# Patient Record
Sex: MALE | NOT HISPANIC OR LATINO | URBAN - METROPOLITAN AREA
[De-identification: names, ages, dates, MRNs, and addresses within clinical notes are randomized per-mention and may not be internally consistent; named-entity substitution may affect disease eponyms.]

---

## 2019-10-30 ENCOUNTER — OFFICE VISIT (OUTPATIENT)
Dept: INTERNAL MEDICINE | Facility: CLINIC | Age: 29
End: 2019-10-30

## 2019-10-30 VITALS
BODY MASS INDEX: 29.87 KG/M2 | HEIGHT: 61 IN | DIASTOLIC BLOOD PRESSURE: 84 MMHG | RESPIRATION RATE: 16 BRPM | SYSTOLIC BLOOD PRESSURE: 132 MMHG | HEART RATE: 61 BPM | WEIGHT: 158.2 LBS | OXYGEN SATURATION: 99 %

## 2019-10-30 DIAGNOSIS — J30.1 SEASONAL ALLERGIC RHINITIS DUE TO POLLEN: ICD-10-CM

## 2019-10-30 DIAGNOSIS — Z23 NEED FOR VACCINATION: ICD-10-CM

## 2019-10-30 DIAGNOSIS — E55.9 VITAMIN D DEFICIENCY: ICD-10-CM

## 2019-10-30 DIAGNOSIS — D16.9 MULTIPLE HEREDITARY OSTEOCHONDROMAS: ICD-10-CM

## 2019-10-30 DIAGNOSIS — R03.0 ELEVATED BLOOD PRESSURE READING: ICD-10-CM

## 2019-10-30 DIAGNOSIS — Z13.29 SCREENING FOR THYROID DISORDER: ICD-10-CM

## 2019-10-30 DIAGNOSIS — Z13.1 SCREENING FOR DIABETES MELLITUS: ICD-10-CM

## 2019-10-30 DIAGNOSIS — E78.2 MIXED HYPERLIPIDEMIA: Primary | ICD-10-CM

## 2019-10-30 DIAGNOSIS — M79.605 LEFT LEG PAIN: ICD-10-CM

## 2019-10-30 PROCEDURE — 90686 IIV4 VACC NO PRSV 0.5 ML IM: CPT | Performed by: INTERNAL MEDICINE

## 2019-10-30 PROCEDURE — 90471 IMMUNIZATION ADMIN: CPT | Performed by: INTERNAL MEDICINE

## 2019-10-30 PROCEDURE — 99204 OFFICE O/P NEW MOD 45 MIN: CPT | Performed by: INTERNAL MEDICINE

## 2019-10-30 NOTE — PROGRESS NOTES
Internal Medicine New Patient  Anya Lorenzana is a 29 y.o. male who presents today to establish care and with concerns as outlined below.    Chief Complaint  Chief Complaint   Patient presents with   • Establish Care        HPI  Had high cholesterol and high blood pressure on previous annual exam at [facility], last August. He has been doing diet to control these. Does not check blood pressure at home. Does note occipital headache a few times over the last 4 months, wonders if this is due to high BP. Takes ibuprofen and rests to resolve the pain. No vision changes, N/V, weakness, associated with the headaches.    For 1 month has had left shin and heal pain intermittently. It feels to him like it starts in the bone near the ankle. It has occurred twice. Lasts 2-3 days and then resolves for several weeks before returning. He does not note anything that exacerbates the pain. Does play volleyball on Fridays but does not seem associated. He notes extra bony growth at the proximal aspect of many of his long bones. Notes this is also present in multiple brothers and his son, is genetic. No cancers in his family. 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    Has seasonal allergies worst in the spring. Does not take any antihistamines.         Review of Systems  Review of Systems   Constitutional: Negative.    HENT: Negative.    Eyes: Negative.    Respiratory: Negative.    Cardiovascular: Negative.    Gastrointestinal: Negative.    Genitourinary: Negative.    Musculoskeletal: Positive for arthralgias (left leg).   Skin: Negative.    Allergic/Immunologic: Positive for environmental allergies.   Neurological: Positive for headache. Negative for weakness and numbness.        Past Medical History  Past Medical History:   Diagnosis Date   • Headache    • Hyperlipidemia    • Hypertension         Surgical History  History reviewed. No pertinent surgical history.     Family History  Family History   Problem Relation  "Age of Onset   • Hypertension Mother    • Hypertension Father    • Diabetes Father    • Stroke Father 56        recurrent        Social History  Social History     Socioeconomic History   • Marital status:      Spouse name: Not on file   • Number of children: Not on file   • Years of education: Not on file   • Highest education level: Not on file   Tobacco Use   • Smoking status: Never Smoker   • Smokeless tobacco: Never Used   Substance and Sexual Activity   • Alcohol use: No     Frequency: Never   • Drug use: No        Current Medications  No current outpatient medications on file prior to visit.     No current facility-administered medications on file prior to visit.        Allergies  No Known Allergies     Objective  Visit Vitals  /84   Pulse 61   Resp 16   Ht 155 cm (61.02\")   Wt 71.8 kg (158 lb 3.2 oz)   SpO2 99%   BMI 29.87 kg/m²        Physical Exam  Physical Exam   Constitutional: He is oriented to person, place, and time. He appears well-developed and well-nourished. No distress.   Short stature     HENT:   Head: Normocephalic and atraumatic.   Right Ear: Tympanic membrane and external ear normal.   Left Ear: Tympanic membrane and external ear normal.   Nose: Nose normal.   Mouth/Throat: Oropharynx is clear and moist. No oropharyngeal exudate.   Eyes: Conjunctivae and EOM are normal. Pupils are equal, round, and reactive to light. No scleral icterus.   Neck: Neck supple. No thyromegaly present.   Cardiovascular: Normal rate, regular rhythm, normal heart sounds and intact distal pulses.   No murmur heard.  Pulmonary/Chest: Effort normal and breath sounds normal. No respiratory distress.   Abdominal: Soft. Bowel sounds are normal. He exhibits no distension. There is no tenderness.   Musculoskeletal: He exhibits deformity (bony growth palpable at proximal end of femur, tibia, humerus bilaterally.). He exhibits no edema.   Varus deformity. No pain to palpation of bony growths. No swelling at " ankle.    Lymphadenopathy:     He has no cervical adenopathy.   Neurological: He is alert and oriented to person, place, and time. No cranial nerve deficit.   Skin: Skin is warm and dry. No rash noted. He is not diaphoretic.   Psychiatric: He has a normal mood and affect. His behavior is normal. Judgment and thought content normal.   Nursing note and vitals reviewed.       Results  No results found for this or any previous visit.     Assessment and Plan  Anya was seen today for establish care.    Diagnoses and all orders for this visit:    Mixed hyperlipidemia  - Reports previously having borderline elevated lipids, has been using diet to lower  - Will request records from prior PCP at [facility]  - Repeat lipid panel today    Seasonal allergic rhinitis due to pollen  - Worst in spring due to pollen  - Advised to start OTC antihistamine the week prior to typical onset of symptoms and continue throughout the season    Elevated blood pressure reading  - BP today slightly above goal but he reports this is improved from prior reading at [facility]  - Script for BP cuff given today, recommend daily BP measurement at home with log brought to next appt.  - CMP today    Multiple hereditary osteochondromas and Left leg pain  - History of multiple hereditary osteochondromas in multiple brothers, son  - He has several palpable bony growths at proximal ends of long bones as well as associated varus deformity and short stature  - Has developed new onset left leg pain that may be secondary to varus deformity or MHO  - Given possibility for malignant transformation, need for periodic monitoring, and possible need for intervention will refer to orthopedics for their evaluation and recommendations    Vitamin D deficiency  - Notes prior borderline low level  - Will repeat today    Screening for thyroid disorder  - TSH today    Screening for diabetes mellitus  - A1c today    Health Maintenance   Topic Date Due   • ANNUAL PHYSICAL   03/07/1992   • TDAP/TD VACCINES (1 - Tdap) 03/07/2008   • INFLUENZA VACCINE  08/01/2019   • LIPID PANEL  10/30/2019     Health Maintenance  - Colonoscopy: At age 49.  - Immunizations: Flu today. Unsure when he had last tdap but possible 2012, he will try to confirm. Advised that he can get this at the pharmacy if he finds that he has not had it in the last 10y.  - Depression screening: Address at next visit.    Return in about 3 months (around 1/30/2020) for Follow up elevated BP.    Addendum:  Requested records from prior PCP Dr. Orion Da Silva. Received plain films of right and left tibula-fibula, left humerus. Obtained due to pain. All radiology reports significant for multiple osteochondromas.

## 2019-10-30 NOTE — PATIENT INSTRUCTIONS
Monitor blood pressure daily and keep log of readings. Goal blood pressure is less than 130/80. Call office if persistently above 160/100. Otherwise bring the log of readings with you to your next appointment.    You will be called to schedule an appointment with an orthopedic doctor to evaluate your hereditary multiple osteochondromas and leg pain.    You can take an over the counter antihistamine during the week before your allergy symptoms start in the spring, such as allegra, claritin, or zyrtec.

## 2019-10-31 ENCOUNTER — LAB (OUTPATIENT)
Dept: INTERNAL MEDICINE | Facility: CLINIC | Age: 29
End: 2019-10-31

## 2019-10-31 DIAGNOSIS — R03.0 ELEVATED BLOOD PRESSURE READING: ICD-10-CM

## 2019-10-31 DIAGNOSIS — Z13.29 SCREENING FOR THYROID DISORDER: ICD-10-CM

## 2019-10-31 DIAGNOSIS — Z13.1 SCREENING FOR DIABETES MELLITUS: ICD-10-CM

## 2019-10-31 DIAGNOSIS — E55.9 VITAMIN D DEFICIENCY: ICD-10-CM

## 2019-10-31 DIAGNOSIS — E78.2 MIXED HYPERLIPIDEMIA: ICD-10-CM

## 2019-10-31 LAB
25(OH)D3 SERPL-MCNC: 20.8 NG/ML (ref 30–100)
ALBUMIN SERPL-MCNC: 4.4 G/DL (ref 3.5–5.2)
ALBUMIN/GLOB SERPL: 1.2 G/DL
ALP SERPL-CCNC: 72 U/L (ref 39–117)
ALT SERPL W P-5'-P-CCNC: 20 U/L (ref 1–41)
ANION GAP SERPL CALCULATED.3IONS-SCNC: 7.2 MMOL/L (ref 5–15)
AST SERPL-CCNC: 18 U/L (ref 1–40)
BILIRUB SERPL-MCNC: 0.6 MG/DL (ref 0.2–1.2)
BUN BLD-MCNC: 12 MG/DL (ref 6–20)
BUN/CREAT SERPL: 12.8 (ref 7–25)
CALCIUM SPEC-SCNC: 9.7 MG/DL (ref 8.6–10.5)
CHLORIDE SERPL-SCNC: 103 MMOL/L (ref 98–107)
CHOLEST SERPL-MCNC: 228 MG/DL (ref 0–200)
CO2 SERPL-SCNC: 31.8 MMOL/L (ref 22–29)
CREAT BLD-MCNC: 0.94 MG/DL (ref 0.76–1.27)
DEPRECATED RDW RBC AUTO: 43.1 FL (ref 37–54)
ERYTHROCYTE [DISTWIDTH] IN BLOOD BY AUTOMATED COUNT: 13.5 % (ref 12.3–15.4)
GFR SERPL CREATININE-BSD FRML MDRD: 114 ML/MIN/1.73
GFR SERPL CREATININE-BSD FRML MDRD: 94 ML/MIN/1.73
GLOBULIN UR ELPH-MCNC: 3.8 GM/DL
GLUCOSE BLD-MCNC: 87 MG/DL (ref 65–99)
HBA1C MFR BLD: 5.55 % (ref 4.8–5.6)
HCT VFR BLD AUTO: 43.7 % (ref 37.5–51)
HDLC SERPL-MCNC: 50 MG/DL (ref 40–60)
HGB BLD-MCNC: 14.5 G/DL (ref 13–17.7)
LDLC SERPL CALC-MCNC: 156 MG/DL (ref 0–100)
LDLC/HDLC SERPL: 3.12 {RATIO}
MCH RBC QN AUTO: 28.8 PG (ref 26.6–33)
MCHC RBC AUTO-ENTMCNC: 33.2 G/DL (ref 31.5–35.7)
MCV RBC AUTO: 86.9 FL (ref 79–97)
PLATELET # BLD AUTO: 140 10*3/MM3 (ref 140–450)
PMV BLD AUTO: 13.4 FL (ref 6–12)
POTASSIUM BLD-SCNC: 4.8 MMOL/L (ref 3.5–5.2)
PROT SERPL-MCNC: 8.2 G/DL (ref 6–8.5)
RBC # BLD AUTO: 5.03 10*6/MM3 (ref 4.14–5.8)
SODIUM BLD-SCNC: 142 MMOL/L (ref 136–145)
TRIGL SERPL-MCNC: 109 MG/DL (ref 0–150)
TSH SERPL DL<=0.05 MIU/L-ACNC: 1.36 UIU/ML (ref 0.27–4.2)
VLDLC SERPL-MCNC: 21.8 MG/DL (ref 5–40)
WBC NRBC COR # BLD: 7.11 10*3/MM3 (ref 3.4–10.8)

## 2019-10-31 PROCEDURE — 84443 ASSAY THYROID STIM HORMONE: CPT | Performed by: INTERNAL MEDICINE

## 2019-10-31 PROCEDURE — 82306 VITAMIN D 25 HYDROXY: CPT | Performed by: INTERNAL MEDICINE

## 2019-10-31 PROCEDURE — 85027 COMPLETE CBC AUTOMATED: CPT | Performed by: INTERNAL MEDICINE

## 2019-10-31 PROCEDURE — 83036 HEMOGLOBIN GLYCOSYLATED A1C: CPT | Performed by: INTERNAL MEDICINE

## 2019-10-31 PROCEDURE — 80061 LIPID PANEL: CPT | Performed by: INTERNAL MEDICINE

## 2019-10-31 PROCEDURE — 80053 COMPREHEN METABOLIC PANEL: CPT | Performed by: INTERNAL MEDICINE

## 2019-11-01 DIAGNOSIS — E55.9 VITAMIN D DEFICIENCY: Primary | ICD-10-CM

## 2019-11-01 RX ORDER — ERGOCALCIFEROL 1.25 MG/1
50000 CAPSULE ORAL WEEKLY
Qty: 12 CAPSULE | Refills: 0 | Status: SHIPPED | OUTPATIENT
Start: 2019-11-01 | End: 2020-08-24

## 2019-11-06 ENCOUNTER — TELEPHONE (OUTPATIENT)
Dept: INTERNAL MEDICINE | Facility: CLINIC | Age: 29
End: 2019-11-06

## 2019-11-18 ENCOUNTER — TELEPHONE (OUTPATIENT)
Dept: ORTHOPEDIC SURGERY | Facility: CLINIC | Age: 29
End: 2019-11-18

## 2019-12-17 ENCOUNTER — OFFICE VISIT (OUTPATIENT)
Dept: INTERNAL MEDICINE | Facility: CLINIC | Age: 29
End: 2019-12-17

## 2019-12-17 VITALS
TEMPERATURE: 98.3 F | OXYGEN SATURATION: 99 % | HEIGHT: 61 IN | WEIGHT: 160 LBS | BODY MASS INDEX: 30.21 KG/M2 | RESPIRATION RATE: 16 BRPM | DIASTOLIC BLOOD PRESSURE: 80 MMHG | SYSTOLIC BLOOD PRESSURE: 124 MMHG | HEART RATE: 76 BPM

## 2019-12-17 DIAGNOSIS — A08.4 VIRAL GASTROENTERITIS: Primary | ICD-10-CM

## 2019-12-17 LAB
EXPIRATION DATE: NORMAL
FLUAV AG NPH QL: NEGATIVE
FLUBV AG NPH QL: NEGATIVE
INTERNAL CONTROL: NORMAL
Lab: NORMAL

## 2019-12-17 PROCEDURE — 87804 INFLUENZA ASSAY W/OPTIC: CPT | Performed by: INTERNAL MEDICINE

## 2019-12-17 PROCEDURE — 99213 OFFICE O/P EST LOW 20 MIN: CPT | Performed by: INTERNAL MEDICINE

## 2019-12-17 NOTE — PATIENT INSTRUCTIONS
[facility] Diet  A bland diet consists of foods that are often soft and do not have a lot of fat, fiber, or extra seasonings. Foods without fat, fiber, or seasoning are easier for the body to digest. They are also less likely to irritate your mouth, throat, stomach, and other parts of your digestive system. A bland diet is sometimes called a BRAT diet.  What is my plan?  Your health care provider or food and nutrition specialist (dietitian) may recommend specific changes to your diet to prevent symptoms or to treat your symptoms. These changes may include:  · Eating small meals often.  · Cooking food until it is soft enough to chew easily.  · Chewing your food well.  · Drinking fluids slowly.  · Not eating foods that are very spicy, sour, or fatty.  · Not eating citrus fruits, such as oranges and grapefruit.  What do I need to know about this diet?  · Eat a variety of foods from the bland diet food list.  · Do not follow a bland diet longer than needed.  · Ask your health care provider whether you should take vitamins or supplements.  What foods can I eat?  Grains    Hot cereals, such as cream of wheat. Rice. Bread, crackers, or tortillas made from refined white flour.  Vegetables  Canned or cooked vegetables. Mashed or boiled potatoes.  Fruits    Bananas. Applesauce. Other types of cooked or canned fruit with the skin and seeds removed, such as canned peaches or pears.  Meats and other proteins    Scrambled eggs. Creamy peanut butter or other nut butters. Lean, well-cooked meats, such as chicken or fish. Tofu. Soups or broths.  Dairy  Low-fat dairy products, such as milk, cottage cheese, or yogurt.  Beverages    Water. Herbal tea. Apple juice.  Fats and oils  Mild salad dressings. Canola or olive oil.  Sweets and desserts  Pudding. Custard. Fruit gelatin. Ice cream.  The items listed above may not be a complete list of recommended foods and beverages. Contact a dietitian for more options.  What foods are not  recommended?  Grains  Whole grain breads and cereals.  Vegetables  Raw vegetables.  Fruits  Raw fruits, especially citrus, berries, or dried fruits.  Dairy  Whole fat dairy foods.  Beverages  Caffeinated drinks. Alcohol.  Seasonings and condiments  Strongly flavored seasonings or condiments. Hot sauce. Salsa.  Other foods  Spicy foods. Fried foods. Sour foods, such as pickled or fermented foods. Foods with high sugar content. Foods high in fiber.  The items listed above may not be a complete list of foods and beverages to avoid. Contact a dietitian for more information.  Summary  · A bland diet consists of foods that are often soft and do not have a lot of fat, fiber, or extra seasonings.  · Foods without fat, fiber, or seasoning are easier for the body to digest.  · Check with your health care provider to see how long you should follow this diet plan. It is not meant to be followed for long periods.  This information is not intended to replace advice given to you by your health care provider. Make sure you discuss any questions you have with your health care provider.  Document Released: 04/10/2017 Document Revised: 01/16/2019 Document Reviewed: 01/16/2019  [employer] Interactive Patient Education © 2019 [employer]

## 2019-12-17 NOTE — PROGRESS NOTES
"Internal Medicine Acute Visit    Chief Complaint   Patient presents with   • Flu Symptoms     x 2 days        HPI  Mr. Lorenzana comes in today with illness for 1.5 days. He has felt warm in his body but cool in his hands and feet. Has been turning up the heat in his apartment. His eyes hurt and he is sensitive to light. He has had headache. He has had 6 episodes of diarrhea yesterday and 1 today. No blood in stools. Has associated abdominal pain with episodes of diarrhea. No N/V. Has poor appetite but is able to tolerate food when he does eat. He also notes body aches and pains. Wife had same symptoms one day before him and her illness has resolved. Has been using fevadol (like ibuprofen) which has helped his subjective fever but does not help his headache.       Review of Systems  Review of Systems   Constitutional: Positive for chills, fatigue and fever (subjective).   HENT: Negative for congestion, ear pain, rhinorrhea, sore throat and trouble swallowing.    Eyes: Positive for photophobia and pain.   Respiratory: Negative for cough and shortness of breath.    Gastrointestinal: Positive for abdominal pain and diarrhea. Negative for blood in stool, nausea and vomiting.   Musculoskeletal: Positive for arthralgias and myalgias. Negative for neck pain and neck stiffness.   Skin: Negative for rash.   Neurological: Positive for headache.        Medications  Current Outpatient Medications on File Prior to Visit   Medication Sig Dispense Refill   • vitamin D (ERGOCALCIFEROL) 1.25 MG (59277 UT) capsule capsule Take 1 capsule by mouth 1 (One) Time Per Week. 12 capsule 0     No current facility-administered medications on file prior to visit.         Allergies  No Known Allergies    PMH  Past Medical History:   Diagnosis Date   • Headache    • Hyperlipidemia    • Hypertension      Objective  Visit Vitals  /80   Pulse 76   Temp 98.3 °F (36.8 °C)   Resp 16   Ht 154.9 cm (61\")   Wt 72.6 kg (160 lb)   SpO2 99%   BMI 30.23 " kg/m²        Physical Exam  Physical Exam   Constitutional: He is oriented to person, place, and time. He appears well-developed and well-nourished. No distress.   HENT:   Head: Normocephalic and atraumatic.   Right Ear: Tympanic membrane, external ear and ear canal normal.   Left Ear: Tympanic membrane, external ear and ear canal normal.   Nose: Nose normal. No mucosal edema.   Mouth/Throat: No posterior oropharyngeal edema or posterior oropharyngeal erythema. Tonsils are 1+ on the right. Tonsils are 1+ on the left. No tonsillar exudate.   Eyes: Pupils are equal, round, and reactive to light. EOM are normal. Right conjunctiva is not injected. Left conjunctiva is not injected.   Neck: Normal range of motion. Muscular tenderness (when neck turned to left) present. No neck rigidity. No Brudzinski's sign and no Kernig's sign noted.   Cardiovascular: Normal rate, regular rhythm, normal heart sounds and intact distal pulses.   Pulmonary/Chest: Effort normal and breath sounds normal. No respiratory distress. He has no wheezes.   Abdominal: Soft. Bowel sounds are normal. There is tenderness in the right upper quadrant, epigastric area and left upper quadrant. There is no rigidity, no rebound and no guarding.   Neurological: He is alert and oriented to person, place, and time. No cranial nerve deficit.   Skin: Skin is warm and dry.   Psychiatric: He has a normal mood and affect. His behavior is normal.       Results  Results for orders placed or performed in visit on 12/17/19   POCT Influenza A/B   Result Value Ref Range    Rapid Influenza A Ag Negative Negative    Rapid Influenza B Ag Negative Negative    Internal Control Passed Passed    Lot Number [id number]     Expiration Date 12/11/2021         Assessment and Plan  Anya was seen today for flu symptoms.    Diagnoses and all orders for this visit:    Viral gastroenteritis  - Flu negative  - Symptoms consistent with viral gastroenteritis and are improving. Has headache  likely due to dehydration secondary to diarrhea and poor oral intake.  - considered meningitis but not ill appearing and kernig, brudzinski signs negative.  - Advised rest and hydration. Continue fevadol (paracetamol) and can use ibuprofen PRN for aches/pains and headache.  - He was instructed that symptoms should improve within a week and if worsening or not improved to return to clinic       Return for if symptom not improved within 7 days or worsening. Keep scheduled follow up on 1/30/2020.

## 2020-02-05 ENCOUNTER — LAB (OUTPATIENT)
Dept: LAB | Facility: HOSPITAL | Age: 30
End: 2020-02-05

## 2020-02-05 ENCOUNTER — OFFICE VISIT (OUTPATIENT)
Dept: INTERNAL MEDICINE | Facility: CLINIC | Age: 30
End: 2020-02-05

## 2020-02-05 VITALS
TEMPERATURE: 97.8 F | HEART RATE: 55 BPM | DIASTOLIC BLOOD PRESSURE: 74 MMHG | BODY MASS INDEX: 30.06 KG/M2 | SYSTOLIC BLOOD PRESSURE: 122 MMHG | RESPIRATION RATE: 16 BRPM | OXYGEN SATURATION: 92 % | HEIGHT: 61 IN | WEIGHT: 159.2 LBS

## 2020-02-05 DIAGNOSIS — R03.0 ELEVATED BLOOD PRESSURE READING: ICD-10-CM

## 2020-02-05 DIAGNOSIS — E55.9 VITAMIN D DEFICIENCY: ICD-10-CM

## 2020-02-05 DIAGNOSIS — M25.562 ACUTE PAIN OF BOTH KNEES: ICD-10-CM

## 2020-02-05 DIAGNOSIS — D16.9 MULTIPLE HEREDITARY OSTEOCHONDROMAS: Primary | ICD-10-CM

## 2020-02-05 DIAGNOSIS — M25.561 ACUTE PAIN OF BOTH KNEES: ICD-10-CM

## 2020-02-05 LAB — 25(OH)D3 SERPL-MCNC: 25.9 NG/ML (ref 30–100)

## 2020-02-05 PROCEDURE — 99214 OFFICE O/P EST MOD 30 MIN: CPT | Performed by: INTERNAL MEDICINE

## 2020-02-05 PROCEDURE — 82306 VITAMIN D 25 HYDROXY: CPT | Performed by: INTERNAL MEDICINE

## 2020-02-05 NOTE — PROGRESS NOTES
"Internal Medicine Follow Up    Chief Complaint  Anya Lorenzana is a 29 y.o. male who presents today for follow up of chronic medical conditions outlined below.    Chief Complaint   Patient presents with   • Elevated blood pressure     elevated BP 3 month follow up        HPI  Mr. Lorenzana is here today for follow up. He is here with his wife and small child.    He notes pain in the back of his knees with prolonged >30 minutes of sitting and difficulty fully extending his leg upon standing. He denies associated swelling. No trauma. He no longer reports the prior pain that affected his shins. He does note numbness when he kneels on his shins for a prolonged period of time. This resolves shortly after relieving pressure from his shins. He did not keep his orthopedics appointment scheduled after his prior visit because he claims to have been unaware of the appointment.     He has been taking vitamin D weekly but has missed about 4 weeks.    He monitored blood pressure at home and the highest systolic he has gotten is 124. He wants to calibrate his cuff to ours. No chest pain, SOA.       Review of Systems  Review of Systems   Constitutional: Negative.    Respiratory: Negative.    Cardiovascular: Negative.    Musculoskeletal: Positive for arthralgias and gait problem (difficulty fully extending legs at knees). Negative for joint swelling.   Neurological: Positive for numbness. Negative for weakness.        Current Medications  Current Outpatient Medications on File Prior to Visit   Medication Sig Dispense Refill   • vitamin D (ERGOCALCIFEROL) 1.25 MG (41097 UT) capsule capsule Take 1 capsule by mouth 1 (One) Time Per Week. 12 capsule 0     No current facility-administered medications on file prior to visit.        Allergies  No Known Allergies    Objective  Visit Vitals  /74   Pulse 55   Temp 97.8 °F (36.6 °C)   Resp 16   Ht 154.9 cm (60.98\")   Wt 72.2 kg (159 lb 3.2 oz)   SpO2 92%   BMI 30.10 kg/m²        Physical " Exam  Physical Exam   Constitutional: He is oriented to person, place, and time. He appears well-developed and well-nourished. No distress.   Short stature   HENT:   Head: Normocephalic and atraumatic.   Eyes: Conjunctivae are normal.   Cardiovascular: Normal rate, regular rhythm and normal heart sounds.   Pulmonary/Chest: Effort normal and breath sounds normal. No respiratory distress.   Musculoskeletal:   As noted previously he has bony masses at the proximal end of long bones. He has not palpable tenderness to the anterior or posterior knee bilaterally. No LE edema. No knee effusion. Active ROM at both knees intact. He stands upright with slight varus abnormality of LE. Gait steady.   Neurological: He is alert and oriented to person, place, and time.   Skin: Skin is warm and dry.   Psychiatric: He has a normal mood and affect. His behavior is normal.   Nursing note and vitals reviewed.      Results  No results associated with this encounter.     Assessment and Plan  Anya was seen today for hypertension.    Diagnoses and all orders for this visit:    Multiple hereditary osteochondromas with acute pain of both knees  - History of multiple hereditary osteochondromas in multiple brothers, son  - He has several palpable bony growths at proximal ends of long bones as well as associated varus deformity and short stature  - He complains today of posterior knee pain after sitting that may be secondary to varus deformity or MHO  - Given possibility for malignant transformation, need for periodic monitoring, and possible need for intervention will refer to orthopedics for their evaluation and recommendations. Advised him to call office if he has not been informed of an appointment by early next week so that we can guarantee he is aware of his appointment details.     Vitamin D deficiency  - Vitamin D level 20.8 at last visit, he has been taking weekly ergocalciferol 75781 units but has missed 4 weeks of supplement over the  holidays  - Will repeat level today. Advised him to finish remaining weekly supplement and based upon repeat result I will recommend a subsequent OTC supplement.    Elevated blood pressure reading  - BP in office today at goal and per his report home readings are also at goal  - He will continue to monitor home BP but does not need to do so daily. Advised him to also come in for nurse visit to compare his cuff readings to that of the office cuff.    Health Maintenance  - Colonoscopy: At age 49.  - Immunizations: Flu UTD. Tdap possibly in 2012, has not been confirmed.  - Depression screening: negative 2/2020     Return in about 6 months (around 8/5/2020) for Follow up, Labs today.

## 2020-02-05 NOTE — PATIENT INSTRUCTIONS
Cholesterol Content in Foods  Cholesterol is a waxy, fat-like substance that helps to carry fat in the blood. The body needs cholesterol in small amounts, but too much cholesterol can cause damage to the arteries and heart.  Most people should eat less than 200 milligrams (mg) of cholesterol a day.  Foods with cholesterol    Cholesterol is found in animal-based foods, such as meat, seafood, and dairy. Generally, low-fat dairy and lean meats have less cholesterol than full-fat dairy and fatty meats. The milligrams of cholesterol per serving (mg per serving) of common cholesterol-containing foods are listed below.  Meat and other proteins  · Egg -- one large whole egg has 186 mg.  · Veal shank -- 4 oz has 141 mg.  · Lean ground turkey (93% lean) -- 4 oz has 118 mg.  · Fat-trimmed lamb loin -- 4 oz has 106 mg.  · Lean ground beef (90% lean) -- 4 oz has 100 mg.  · Lobster -- 3.5 oz has 90 mg.  · Pork loin chops -- 4 oz has 86 mg.  · Canned salmon -- 3.5 oz has 83 mg.  · Fat-trimmed beef top loin -- 4 oz has 78 mg.  · Frankfurter -- 1 jordan (3.5 oz) has 77 mg.  · Crab -- 3.5 oz has 71 mg.  · Roasted chicken without skin, white meat -- 4 oz has 66 mg.  · Light bologna -- 2 oz has 45 mg.  · Deli-cut turkey -- 2 oz has 31 mg.  · Canned tuna -- 3.5 oz has 31 mg.  · Thomas -- 1 oz has 29 mg.  · Oysters and mussels (raw) -- 3.5 oz has 25 mg.  · Mackerel -- 1 oz has 22 mg.  · Trout -- 1 oz has 20 mg.  · Pork sausage -- 1 link (1 oz) has 17 mg.  · Biggs -- 1 oz has 16 mg.  · Tilapia -- 1 oz has 14 mg.  Dairy  · Soft-serve ice cream -- ½ cup (4 oz) has 103 mg.  · Whole-milk yogurt -- 1 cup (8 oz) has 29 mg.  · Cheddar cheese -- 1 oz has 28 mg.  · American cheese -- 1 oz has 28 mg.  · Whole milk -- 1 cup (8 oz) has 23 mg.  · 2% milk -- 1 cup (8 oz) has 18 mg.  · Cream cheese -- 1 tablespoon (Tbsp) has 15 mg.  · Cottage cheese -- ½ cup (4 oz) has 14 mg.  · Low-fat (1%) milk -- 1 cup (8 oz) has 10 mg.  · Sour cream -- 1 Tbsp has 8.5  mg.  · Low-fat yogurt -- 1 cup (8 oz) has 8 mg.  · Nonfat Greek yogurt -- 1 cup (8 oz) has 7 mg.  · Half-and-half cream -- 1 Tbsp has 5 mg.  Fats and oils  · Cod liver oil -- 1 tablespoon (Tbsp) has 82 mg.  · Butter -- 1 Tbsp has 15 mg.  · Lard -- 1 Tbsp has 14 mg.  · Thomas grease -- 1 Tbsp has 14 mg.  · Mayonnaise -- 1 Tbsp has 5-10 mg.  · Margarine -- 1 Tbsp has 3-10 mg.  Exact amounts of cholesterol in these foods may vary depending on specific ingredients and brands.  Foods without cholesterol  Most plant-based foods do not have cholesterol unless you combine them with a food that has cholesterol. Foods without cholesterol include:  · Grains and cereals.  · Vegetables.  · Fruits.  · Vegetable oils, such as olive, canola, and sunflower oil.  · Legumes, such as peas, beans, and lentils.  · Nuts and seeds.  · Egg whites.  Summary  · The body needs cholesterol in small amounts, but too much cholesterol can cause damage to the arteries and heart.  · Most people should eat less than 200 milligrams (mg) of cholesterol a day.  This information is not intended to replace advice given to you by your health care provider. Make sure you discuss any questions you have with your health care provider.  Document Released: 08/14/2018 Document Revised: 08/14/2018 Document Reviewed: 08/14/2018  [employer] Interactive Patient Education © 2019 [employer]

## 2020-02-06 PROBLEM — D16.9 MULTIPLE HEREDITARY OSTEOCHONDROMAS: Status: ACTIVE | Noted: 2020-02-06

## 2020-02-06 PROBLEM — E55.9 VITAMIN D DEFICIENCY: Status: ACTIVE | Noted: 2020-02-06

## 2020-02-07 ENCOUNTER — TELEPHONE (OUTPATIENT)
Dept: INTERNAL MEDICINE | Facility: CLINIC | Age: 30
End: 2020-02-07

## 2020-02-07 NOTE — TELEPHONE ENCOUNTER
PT IS CALLING BACK TO GET LAB RESULTS, WANTS TO KNOW IF THEY NEED TO CONTINUE TAKING VIT C, PLEASE CALL ON MON 2-

## 2020-08-24 ENCOUNTER — OFFICE VISIT (OUTPATIENT)
Dept: INTERNAL MEDICINE | Facility: CLINIC | Age: 30
End: 2020-08-24

## 2020-08-24 ENCOUNTER — LAB (OUTPATIENT)
Dept: LAB | Facility: HOSPITAL | Age: 30
End: 2020-08-24

## 2020-08-24 VITALS
SYSTOLIC BLOOD PRESSURE: 112 MMHG | HEIGHT: 61 IN | TEMPERATURE: 97.7 F | RESPIRATION RATE: 16 BRPM | WEIGHT: 162.2 LBS | HEART RATE: 82 BPM | OXYGEN SATURATION: 98 % | DIASTOLIC BLOOD PRESSURE: 74 MMHG | BODY MASS INDEX: 30.62 KG/M2

## 2020-08-24 DIAGNOSIS — D16.9 MULTIPLE HEREDITARY OSTEOCHONDROMAS: Primary | ICD-10-CM

## 2020-08-24 DIAGNOSIS — E55.9 VITAMIN D DEFICIENCY: ICD-10-CM

## 2020-08-24 DIAGNOSIS — Z11.59 ENCOUNTER FOR HEPATITIS C SCREENING TEST FOR LOW RISK PATIENT: ICD-10-CM

## 2020-08-24 DIAGNOSIS — M79.604 PAIN OF RIGHT LOWER EXTREMITY: ICD-10-CM

## 2020-08-24 DIAGNOSIS — E66.09 CLASS 1 OBESITY DUE TO EXCESS CALORIES WITHOUT SERIOUS COMORBIDITY WITH BODY MASS INDEX (BMI) OF 30.0 TO 30.9 IN ADULT: ICD-10-CM

## 2020-08-24 LAB
25(OH)D3 SERPL-MCNC: 24.2 NG/ML (ref 30–100)
HCV AB SER DONR QL: NORMAL

## 2020-08-24 PROCEDURE — 82306 VITAMIN D 25 HYDROXY: CPT | Performed by: INTERNAL MEDICINE

## 2020-08-24 PROCEDURE — 86803 HEPATITIS C AB TEST: CPT | Performed by: INTERNAL MEDICINE

## 2020-08-24 PROCEDURE — 99213 OFFICE O/P EST LOW 20 MIN: CPT | Performed by: INTERNAL MEDICINE

## 2020-08-24 NOTE — PROGRESS NOTES
"Internal Medicine Follow Up    Chief Complaint  Anya Lorenzana is a 30 y.o. male who presents today for follow up of chronic medical conditions outlined below.    Chief Complaint   Patient presents with   • Follow-up     vitamin D deficiency, leg pain        HPI  Mr. Lorenzana comes in today for follow up. Finished up weekly vitamin D and is now taking MVI 3-4 times a week. He has not seen orthopedics. He continues to have right leg pain lasting 10-15 minutes. Located near ankle. He does not take anything as pain spontaneously resolves. No joint swelling or redness. Lastly, he notes weight gain and wishes to speak with a nutritionist.       Review of Systems  Review of Systems   Constitutional: Positive for unexpected weight gain.   Respiratory: Negative.    Cardiovascular: Negative.    Gastrointestinal: Negative.    Musculoskeletal: Positive for arthralgias. Negative for gait problem and joint swelling.        Current Medications  Current Outpatient Medications on File Prior to Visit   Medication Sig Dispense Refill   • [DISCONTINUED] vitamin D (ERGOCALCIFEROL) 1.25 MG (71872 UT) capsule capsule Take 1 capsule by mouth 1 (One) Time Per Week. 12 capsule 0     No current facility-administered medications on file prior to visit.        Allergies  No Known Allergies    Objective  Visit Vitals  /74   Pulse 82   Temp 97.7 °F (36.5 °C)   Resp 16   Ht 154.9 cm (60.98\")   Wt 73.6 kg (162 lb 3.2 oz)   SpO2 98%   BMI 30.66 kg/m²        Physical Exam  Physical Exam   Constitutional: He appears well-developed and well-nourished. No distress.   Short stature   HENT:   Head: Normocephalic and atraumatic.   Right Ear: External ear normal.   Left Ear: External ear normal.   Eyes: Conjunctivae are normal.   Pulmonary/Chest: Effort normal. No respiratory distress.   Musculoskeletal:   Bony masses at the proximal end of long bones. No LE edema. No joint effusion. ROM intact. He stands upright with slight varus abnormality of LE. Gait " steady.   Neurological: He is alert.   Skin: Skin is warm and dry.   Psychiatric: He has a normal mood and affect. His behavior is normal.   Nursing note and vitals reviewed.      Results  Results for orders placed or performed in visit on 02/05/20   Vitamin D 25 Hydroxy   Result Value Ref Range    25 Hydroxy, Vitamin D 25.9 (L) 30.0 - 100.0 ng/ml        Assessment and Plan  Anya was seen today for follow-up.    Diagnoses and all orders for this visit:    Multiple hereditary osteochondromas with Pain of right lower extremity  - History of multiple hereditary osteochondromas in multiple brothers, son  - He has several palpable bony growths at proximal ends of long bones as well as associated varus deformity and short stature  - Has ongoing MSK complaints, today R ankle pain  - Given possibility for malignant transformation, need for periodic monitoring, and possible need for intervention had previously referred to orthopedics but he did not keep appointment. Referred again today.    Vitamin D deficiency  - Completed weekly ergocalciferol and now on MVI. Concerned that this will not be sufficient supplementation so will repeat vitamin D level to confirm.    Class 1 obesity due to excess calories without serious comorbidity with body mass index (BMI) of 30.0 to 30.9 in adult  -  Has not gained much weight by office scales, ~4lbs since December. BMI 30.66.  - Will refer to nutrition at his request.    Encounter for hepatitis C screening test for low risk patient  - HCV ab ordered    Health Maintenance  - Colonoscopy: At age 45-50.  - Immunizations: Flu when available. Tdap possibly in 2012, has not been confirmed.  - HCV: ordered  - Depression screening: negative 2/2020    Return in about 6 months (around 2/24/2021) for Annual, Labs today.

## 2020-08-27 ENCOUNTER — TELEPHONE (OUTPATIENT)
Dept: INTERNAL MEDICINE | Facility: CLINIC | Age: 30
End: 2020-08-27

## 2020-08-27 NOTE — TELEPHONE ENCOUNTER
PT called an stated that he had taken a Covid test and the results came back Positive. But the name on the test was wrong. He had it done at  on 8/22/20 and received results on Tuesday. Pt has requested that he and his wife get tested to make sure the results are his. Please advise.

## 2020-08-27 NOTE — TELEPHONE ENCOUNTER
PATIENT IS REQUESTING A CALL BACK ABOUT HIS BLOOD WORK FROM Monday 8-24-20    PLEASE CALL BACK [phone]

## 2020-08-27 NOTE — TELEPHONE ENCOUNTER
I cannot order the test for them due to policies. He can schedule a test at one of the sites in town. Please get on the [email or web address] site and give them contact info for a few locations. Thank you.

## 2020-08-27 NOTE — TELEPHONE ENCOUNTER
Called Pt and informed him he would have to call and make appointment at one of the [facility] testing sites. Gave him information. PT voiced understanding.

## 2020-08-29 DIAGNOSIS — D16.9 MULTIPLE HEREDITARY OSTEOCHONDROMAS: Primary | ICD-10-CM

## 2020-08-31 ENCOUNTER — TELEPHONE (OUTPATIENT)
Dept: INTERNAL MEDICINE | Facility: CLINIC | Age: 30
End: 2020-08-31

## 2020-09-11 ENCOUNTER — APPOINTMENT (OUTPATIENT)
Dept: NUTRITION | Facility: HOSPITAL | Age: 30
End: 2020-09-11

## 2020-09-25 ENCOUNTER — TELEPHONE (OUTPATIENT)
Dept: INTERNAL MEDICINE | Facility: CLINIC | Age: 30
End: 2020-09-25

## 2020-09-25 NOTE — TELEPHONE ENCOUNTER
[facility]   DR SON'S OFFICE    [phone]      XRAYS AND MRI ARE NEEDED BEFORE PT COMES IN FOR HIS APPOINTMENT OR THEY WILL NEED TO CANCEL

## 2020-09-25 NOTE — TELEPHONE ENCOUNTER
GERSONM for Pt to get xrays and MRI done before his upcoming appointment with . Office number givne.

## 2020-09-25 NOTE — TELEPHONE ENCOUNTER
Pt still has not completed the X-rays Dr. Franks Ordered.  Plus an MRI will need to be ordered.  PT is scheduled on 10/5/2020.

## 2020-10-02 ENCOUNTER — HOSPITAL ENCOUNTER (OUTPATIENT)
Dept: GENERAL RADIOLOGY | Facility: HOSPITAL | Age: 30
Discharge: HOME | End: 2020-10-02
Admitting: INTERNAL MEDICINE

## 2020-10-02 DIAGNOSIS — D16.9 MULTIPLE HEREDITARY OSTEOCHONDROMAS: ICD-10-CM

## 2020-10-02 PROCEDURE — 73590 X-RAY EXAM OF LOWER LEG: CPT

## 2020-10-02 PROCEDURE — 73610 X-RAY EXAM OF ANKLE: CPT

## 2020-10-04 DIAGNOSIS — M79.604 PAIN OF RIGHT LOWER EXTREMITY: ICD-10-CM

## 2020-10-04 DIAGNOSIS — D16.9 MULTIPLE HEREDITARY OSTEOCHONDROMAS: Primary | ICD-10-CM

## 2020-10-08 DIAGNOSIS — E55.9 VITAMIN D DEFICIENCY: Primary | ICD-10-CM

## 2020-10-08 RX ORDER — MULTIVIT-MIN/IRON/FOLIC ACID/K 18-600-40
2000 CAPSULE ORAL DAILY
Qty: 30 CAPSULE | Refills: 11 | Status: SHIPPED | OUTPATIENT
Start: 2020-10-08 | End: 2021-02-25

## 2020-10-23 ENCOUNTER — APPOINTMENT (OUTPATIENT)
Dept: MRI IMAGING | Facility: HOSPITAL | Age: 30
End: 2020-10-23

## 2021-02-25 ENCOUNTER — LAB (OUTPATIENT)
Dept: LAB | Facility: HOSPITAL | Age: 31
End: 2021-02-25

## 2021-02-25 ENCOUNTER — OFFICE VISIT (OUTPATIENT)
Dept: INTERNAL MEDICINE | Facility: CLINIC | Age: 31
End: 2021-02-25

## 2021-02-25 VITALS
HEART RATE: 66 BPM | WEIGHT: 154.8 LBS | HEIGHT: 61 IN | RESPIRATION RATE: 16 BRPM | TEMPERATURE: 97.3 F | DIASTOLIC BLOOD PRESSURE: 80 MMHG | BODY MASS INDEX: 29.23 KG/M2 | SYSTOLIC BLOOD PRESSURE: 118 MMHG | OXYGEN SATURATION: 99 %

## 2021-02-25 DIAGNOSIS — E55.9 VITAMIN D DEFICIENCY: Primary | ICD-10-CM

## 2021-02-25 DIAGNOSIS — Z00.00 ANNUAL PHYSICAL EXAM: Primary | ICD-10-CM

## 2021-02-25 DIAGNOSIS — Z00.00 ANNUAL PHYSICAL EXAM: ICD-10-CM

## 2021-02-25 DIAGNOSIS — Z13.1 SCREENING FOR DIABETES MELLITUS: ICD-10-CM

## 2021-02-25 DIAGNOSIS — H91.90 HEARING LOSS, UNSPECIFIED HEARING LOSS TYPE, UNSPECIFIED LATERALITY: ICD-10-CM

## 2021-02-25 DIAGNOSIS — E78.2 MIXED HYPERLIPIDEMIA: ICD-10-CM

## 2021-02-25 DIAGNOSIS — D16.9 MULTIPLE HEREDITARY OSTEOCHONDROMAS: ICD-10-CM

## 2021-02-25 DIAGNOSIS — E55.9 VITAMIN D DEFICIENCY: ICD-10-CM

## 2021-02-25 DIAGNOSIS — J30.1 SEASONAL ALLERGIC RHINITIS DUE TO POLLEN: ICD-10-CM

## 2021-02-25 PROBLEM — R03.0 ELEVATED BLOOD PRESSURE READING: Status: RESOLVED | Noted: 2019-10-30 | Resolved: 2021-02-25

## 2021-02-25 LAB
25(OH)D3 SERPL-MCNC: 20.5 NG/ML (ref 30–100)
ALBUMIN SERPL-MCNC: 4.5 G/DL (ref 3.5–5.2)
ALBUMIN/GLOB SERPL: 1.4 G/DL
ALP SERPL-CCNC: 71 U/L (ref 39–117)
ALT SERPL W P-5'-P-CCNC: 21 U/L (ref 1–41)
ANION GAP SERPL CALCULATED.3IONS-SCNC: 9.1 MMOL/L (ref 5–15)
AST SERPL-CCNC: 23 U/L (ref 1–40)
BILIRUB SERPL-MCNC: 0.5 MG/DL (ref 0–1.2)
BUN SERPL-MCNC: 14 MG/DL (ref 6–20)
BUN/CREAT SERPL: 17.3 (ref 7–25)
CALCIUM SPEC-SCNC: 9.9 MG/DL (ref 8.6–10.5)
CHLORIDE SERPL-SCNC: 101 MMOL/L (ref 98–107)
CHOLEST SERPL-MCNC: 221 MG/DL (ref 0–200)
CO2 SERPL-SCNC: 27.9 MMOL/L (ref 22–29)
CREAT SERPL-MCNC: 0.81 MG/DL (ref 0.76–1.27)
DEPRECATED RDW RBC AUTO: 44 FL (ref 37–54)
ERYTHROCYTE [DISTWIDTH] IN BLOOD BY AUTOMATED COUNT: 13.6 % (ref 12.3–15.4)
GFR SERPL CREATININE-BSD FRML MDRD: 111 ML/MIN/1.73
GFR SERPL CREATININE-BSD FRML MDRD: 135 ML/MIN/1.73
GLOBULIN UR ELPH-MCNC: 3.2 GM/DL
GLUCOSE SERPL-MCNC: 80 MG/DL (ref 65–99)
HBA1C MFR BLD: 5.35 % (ref 4.8–5.6)
HCT VFR BLD AUTO: 44 % (ref 37.5–51)
HDLC SERPL-MCNC: 51 MG/DL (ref 40–60)
HGB BLD-MCNC: 14.3 G/DL (ref 13–17.7)
LDLC SERPL CALC-MCNC: 144 MG/DL (ref 0–100)
LDLC/HDLC SERPL: 2.77 {RATIO}
MCH RBC QN AUTO: 28.7 PG (ref 26.6–33)
MCHC RBC AUTO-ENTMCNC: 32.5 G/DL (ref 31.5–35.7)
MCV RBC AUTO: 88.2 FL (ref 79–97)
PLATELET # BLD AUTO: 145 10*3/MM3 (ref 140–450)
PMV BLD AUTO: 14 FL (ref 6–12)
POTASSIUM SERPL-SCNC: 4.5 MMOL/L (ref 3.5–5.2)
PROT SERPL-MCNC: 7.7 G/DL (ref 6–8.5)
RBC # BLD AUTO: 4.99 10*6/MM3 (ref 4.14–5.8)
SODIUM SERPL-SCNC: 138 MMOL/L (ref 136–145)
TRIGL SERPL-MCNC: 144 MG/DL (ref 0–150)
VLDLC SERPL-MCNC: 26 MG/DL (ref 5–40)
WBC # BLD AUTO: 7.85 10*3/MM3 (ref 3.4–10.8)

## 2021-02-25 PROCEDURE — 80061 LIPID PANEL: CPT

## 2021-02-25 PROCEDURE — 82306 VITAMIN D 25 HYDROXY: CPT

## 2021-02-25 PROCEDURE — 83036 HEMOGLOBIN GLYCOSYLATED A1C: CPT

## 2021-02-25 PROCEDURE — 85027 COMPLETE CBC AUTOMATED: CPT

## 2021-02-25 PROCEDURE — 80053 COMPREHEN METABOLIC PANEL: CPT

## 2021-02-25 PROCEDURE — 99395 PREV VISIT EST AGE 18-39: CPT | Performed by: INTERNAL MEDICINE

## 2021-02-25 RX ORDER — ERGOCALCIFEROL 1.25 MG/1
50000 CAPSULE ORAL WEEKLY
Qty: 12 CAPSULE | Refills: 0 | Status: SHIPPED | OUTPATIENT
Start: 2021-02-25 | End: 2022-03-03

## 2021-02-25 NOTE — PROGRESS NOTES
Internal Medicine Annual Exam  Anya Lorenzana is a 30 y.o. male who presents today for an annual exam and with concerns as outlined below.    Chief Complaint  Chief Complaint   Patient presents with   • Annual Exam        HPI  Mr. Lorenzana comes in for his annual exam. No longer taking his vitamin D supplement. He notes dental exam UTD, needs to have new crowns and wisdom teeth extracted. Needs vision exam. Feels hearing is chronically poor and wants baseline hearing exam. He reports well balanced diet. Has not been getting as much exercise due to weather but prior to November would exercise 3x weekly. Denies depressed mood. Did not get flu shot this year.       Review of Systems  Review of Systems   Constitutional: Negative.    HENT: Positive for dental problem and hearing loss.    Eyes: Negative.    Respiratory: Negative.    Cardiovascular: Negative.    Gastrointestinal: Negative.    Genitourinary: Negative.    Musculoskeletal: Negative.    Skin: Negative.    Allergic/Immunologic: Negative for environmental allergies.   Neurological: Negative.    Hematological: Negative for adenopathy.   Psychiatric/Behavioral: Negative.         Past Medical History  Past Medical History:   Diagnosis Date   • Elevated blood pressure reading 10/30/2019   • Headache    • Hyperlipidemia    • Hypertension         Surgical History  History reviewed. No pertinent surgical history.     Family History  Family History   Problem Relation Age of Onset   • Hypertension Mother    • Hypertension Father    • Diabetes Father    • Stroke Father 56        recurrent        Social History  Social History     Socioeconomic History   • Marital status:      Spouse name: Not on file   • Number of children: Not on file   • Years of education: Not on file   • Highest education level: Not on file   Tobacco Use   • Smoking status: Never Smoker   • Smokeless tobacco: Never Used   Substance and Sexual Activity   • Alcohol use: No     Frequency: Never   •  "Drug use: No        Current Medications  Current Outpatient Medications on File Prior to Visit   Medication Sig Dispense Refill   • Cholecalciferol (Vitamin D) 50 MCG (2000 UT) capsule Take 1 capsule by mouth Daily. 30 capsule 11     No current facility-administered medications on file prior to visit.        Allergies  No Known Allergies     Objective  Visit Vitals  /80   Pulse 66   Temp 97.3 °F (36.3 °C)   Resp 16   Ht 154.9 cm (60.98\")   Wt 70.2 kg (154 lb 12.8 oz)   SpO2 99%   BMI 29.26 kg/m²        Physical Exam  Physical Exam  Vitals signs and nursing note reviewed.   Constitutional:       General: He is not in acute distress.     Appearance: Normal appearance. He is well-developed. He is not ill-appearing or diaphoretic.   HENT:      Head: Normocephalic and atraumatic.      Right Ear: Tympanic membrane, ear canal and external ear normal.      Left Ear: Tympanic membrane, ear canal and external ear normal.      Ears:      Comments: Hearing intact to conversation     Nose: Nose normal.      Mouth/Throat:      Pharynx: No oropharyngeal exudate.   Eyes:      General: No scleral icterus.     Extraocular Movements: Extraocular movements intact.      Conjunctiva/sclera: Conjunctivae normal.      Pupils: Pupils are equal, round, and reactive to light.   Neck:      Musculoskeletal: Neck supple.   Cardiovascular:      Rate and Rhythm: Normal rate and regular rhythm.      Heart sounds: Normal heart sounds. No murmur.   Pulmonary:      Effort: Pulmonary effort is normal. No respiratory distress.      Breath sounds: Normal breath sounds.   Abdominal:      General: Bowel sounds are normal. There is no distension.      Palpations: Abdomen is soft.      Tenderness: There is no abdominal tenderness.   Musculoskeletal: Normal range of motion.         General: Deformity (Bony masses at the proximal end of long bones. He stands upright with slight varus abnormality of LE. ) present. No swelling.      Right lower leg: No " edema.      Left lower leg: No edema.   Lymphadenopathy:      Cervical: No cervical adenopathy.   Skin:     General: Skin is warm and dry.      Coloration: Skin is not jaundiced.      Findings: No rash.   Neurological:      General: No focal deficit present.      Mental Status: He is alert and oriented to person, place, and time. Mental status is at baseline.      Gait: Gait normal.   Psychiatric:         Mood and Affect: Mood normal.         Behavior: Behavior normal.         Thought Content: Thought content normal.         Judgment: Judgment normal.          Results  Results for orders placed or performed in visit on 08/24/20   Vitamin D 25 Hydroxy    Specimen: Blood   Result Value Ref Range    25 Hydroxy, Vitamin D 24.2 (L) 30.0 - 100.0 ng/ml   Hepatitis C Antibody    Specimen: Blood   Result Value Ref Range    Hepatitis C Ab Non-Reactive Non-Reactive        Assessment and Plan  Diagnoses and all orders for this visit:    Annual physical exam  - Counseling was given to patient for the following topics:  appropriate exercise, healthy eating habits, disease prevention and importance of immunizations, including risks and benefits. Also discussed the importance of regular dental and vision care, as well recommendation for a yearly screening skin exam after age 40.  Written information provided to patient on these topics and other health maintenance issues.  - Labs ordered as below as well as CBC, CMP  - Discussed flu shot    Multiple hereditary osteochondromas  - History of multiple hereditary osteochondromas in multiple brothers, son  - He has several palpable bony growths at proximal ends of long bones as well as associated varus deformity and short stature  - Has previously been referred to orthopedics for evaluation of MSK complaints. Given concern was scheduled with [facility] who required imaging with xray, MRI prior to appt. Patient did not complete imaging and appt cancelled.    Vitamin D deficiency  - Level in  August remains insufficient. He has discontinued supplementation once again.   - Discussed likely need for longterm supplementation.  - Repeat vit D level today and resume supplement pending result    Mixed hyperlipidemia  -  last year, treating with diet and exercise.  - Repeat lipid panel today    Seasonal allergic rhinitis due to pollen  - Previous seasonal allergies in Spring, denies issues with allergies     Screening for diabetes mellitus  - A1c ordered    Hearing loss, unspecified hearing loss type, unspecified laterality  - Reports chronic hearing loss, difficulty in conversation.  - Requests referral for baseline hearing evaluation, referred to audiology.    Health Maintenance   Topic Date Due   • ANNUAL PHYSICAL  03/07/1992   • TDAP/TD VACCINES (1 - Tdap) 03/07/2008   • INFLUENZA VACCINE  08/01/2020   • LIPID PANEL  10/31/2020   • HEPATITIS C SCREENING  Completed   • Pneumococcal Vaccine 0-64  Aged Out   • MENINGOCOCCAL VACCINE  Aged Out     Health Maintenance  - Colonoscopy: At age 44.  - Immunizations: Flu unavailable. Tdap possibly in 2012, has not been confirmed.  - HCV: negative  - Depression screening: negative 2/2020      Return in about 1 year (around 2/25/2022) for Annual, Labs today.

## 2021-02-26 ENCOUNTER — TELEPHONE (OUTPATIENT)
Dept: INTERNAL MEDICINE | Facility: CLINIC | Age: 31
End: 2021-02-26

## 2021-02-26 NOTE — TELEPHONE ENCOUNTER
----- Message from Briana Franks MD sent at 2/25/2021 10:18 PM EST -----  Please call Mr. Lorenzana and let him know that his recent labs show that his vitamin D has decreased from 24 to 20.5. He needs to resume the weekly vitamin D for 12 weeks followed by 2000 IU daily. His diabetes screening is negative. His liver and kidney function are normal. He continues to have high LDL cholesterol however it has improved this year compared to last year. I recommend that he works on a diet low in saturated fats and high in dietary fiber and omega 3 fatty acids. I also recommend daily exercise as tolerated.

## 2021-06-05 DIAGNOSIS — E55.9 VITAMIN D DEFICIENCY: ICD-10-CM

## 2021-06-05 RX ORDER — ERGOCALCIFEROL 1.25 MG/1
CAPSULE ORAL
Qty: 12 CAPSULE | Refills: 0 | OUTPATIENT
Start: 2021-06-05

## 2022-03-03 ENCOUNTER — LAB (OUTPATIENT)
Dept: LAB | Facility: HOSPITAL | Age: 32
End: 2022-03-03

## 2022-03-03 ENCOUNTER — OFFICE VISIT (OUTPATIENT)
Dept: INTERNAL MEDICINE | Facility: CLINIC | Age: 32
End: 2022-03-03

## 2022-03-03 VITALS
RESPIRATION RATE: 16 BRPM | HEART RATE: 73 BPM | SYSTOLIC BLOOD PRESSURE: 120 MMHG | HEIGHT: 61 IN | DIASTOLIC BLOOD PRESSURE: 70 MMHG | BODY MASS INDEX: 29.68 KG/M2 | OXYGEN SATURATION: 98 % | WEIGHT: 157.2 LBS | TEMPERATURE: 97.1 F

## 2022-03-03 DIAGNOSIS — E78.2 MIXED HYPERLIPIDEMIA: ICD-10-CM

## 2022-03-03 DIAGNOSIS — E55.9 VITAMIN D DEFICIENCY: ICD-10-CM

## 2022-03-03 DIAGNOSIS — Z00.00 ANNUAL PHYSICAL EXAM: Primary | ICD-10-CM

## 2022-03-03 DIAGNOSIS — M54.6 ACUTE MIDLINE THORACIC BACK PAIN: ICD-10-CM

## 2022-03-03 DIAGNOSIS — G44.219 EPISODIC TENSION-TYPE HEADACHE, NOT INTRACTABLE: ICD-10-CM

## 2022-03-03 DIAGNOSIS — M25.531 RIGHT WRIST PAIN: ICD-10-CM

## 2022-03-03 DIAGNOSIS — D16.9 MULTIPLE HEREDITARY OSTEOCHONDROMAS: ICD-10-CM

## 2022-03-03 DIAGNOSIS — R31.0 GROSS HEMATURIA: ICD-10-CM

## 2022-03-03 DIAGNOSIS — Z00.00 ANNUAL PHYSICAL EXAM: ICD-10-CM

## 2022-03-03 LAB
25(OH)D3 SERPL-MCNC: 23.1 NG/ML
ALBUMIN SERPL-MCNC: 4.9 G/DL (ref 3.5–5.2)
ALBUMIN/GLOB SERPL: 1.6 G/DL
ALP SERPL-CCNC: 69 U/L (ref 39–117)
ALT SERPL W P-5'-P-CCNC: 32 U/L (ref 1–41)
ANION GAP SERPL CALCULATED.3IONS-SCNC: 10.1 MMOL/L (ref 5–15)
AST SERPL-CCNC: 25 U/L (ref 1–40)
BACTERIA UR QL AUTO: NORMAL /HPF
BILIRUB SERPL-MCNC: 0.6 MG/DL (ref 0–1.2)
BILIRUB UR QL STRIP: NEGATIVE
BUN SERPL-MCNC: 13 MG/DL (ref 6–20)
BUN/CREAT SERPL: 14.4 (ref 7–25)
CALCIUM SPEC-SCNC: 9.8 MG/DL (ref 8.6–10.5)
CHLORIDE SERPL-SCNC: 102 MMOL/L (ref 98–107)
CHOLEST SERPL-MCNC: 260 MG/DL (ref 0–200)
CLARITY UR: CLEAR
CO2 SERPL-SCNC: 26.9 MMOL/L (ref 22–29)
COLOR UR: YELLOW
CREAT SERPL-MCNC: 0.9 MG/DL (ref 0.76–1.27)
DEPRECATED RDW RBC AUTO: 43.3 FL (ref 37–54)
EGFRCR SERPLBLD CKD-EPI 2021: 116.4 ML/MIN/1.73
ERYTHROCYTE [DISTWIDTH] IN BLOOD BY AUTOMATED COUNT: 13.6 % (ref 12.3–15.4)
GLOBULIN UR ELPH-MCNC: 3 GM/DL
GLUCOSE SERPL-MCNC: 85 MG/DL (ref 65–99)
GLUCOSE UR STRIP-MCNC: NEGATIVE MG/DL
HCT VFR BLD AUTO: 42.7 % (ref 37.5–51)
HDLC SERPL-MCNC: 55 MG/DL (ref 40–60)
HGB BLD-MCNC: 14.2 G/DL (ref 13–17.7)
HGB UR QL STRIP.AUTO: NEGATIVE
HYALINE CASTS UR QL AUTO: NORMAL /LPF
KETONES UR QL STRIP: NEGATIVE
LDLC SERPL CALC-MCNC: 183 MG/DL (ref 0–100)
LDLC/HDLC SERPL: 3.29 {RATIO}
LEUKOCYTE ESTERASE UR QL STRIP.AUTO: NEGATIVE
MCH RBC QN AUTO: 29.2 PG (ref 26.6–33)
MCHC RBC AUTO-ENTMCNC: 33.3 G/DL (ref 31.5–35.7)
MCV RBC AUTO: 87.7 FL (ref 79–97)
NITRITE UR QL STRIP: NEGATIVE
PH UR STRIP.AUTO: 7 [PH] (ref 5–8)
PLATELET # BLD AUTO: 156 10*3/MM3 (ref 140–450)
PMV BLD AUTO: 12.8 FL (ref 6–12)
POTASSIUM SERPL-SCNC: 5.2 MMOL/L (ref 3.5–5.2)
PROT SERPL-MCNC: 7.9 G/DL (ref 6–8.5)
PROT UR QL STRIP: NEGATIVE
RBC # BLD AUTO: 4.87 10*6/MM3 (ref 4.14–5.8)
RBC # UR STRIP: NORMAL /HPF
REF LAB TEST METHOD: NORMAL
SODIUM SERPL-SCNC: 139 MMOL/L (ref 136–145)
SP GR UR STRIP: 1.02 (ref 1–1.03)
SQUAMOUS #/AREA URNS HPF: NORMAL /HPF
TRIGL SERPL-MCNC: 121 MG/DL (ref 0–150)
UROBILINOGEN UR QL STRIP: NORMAL
VLDLC SERPL-MCNC: 22 MG/DL (ref 5–40)
WBC # UR STRIP: NORMAL /HPF
WBC NRBC COR # BLD: 7.61 10*3/MM3 (ref 3.4–10.8)

## 2022-03-03 PROCEDURE — 80061 LIPID PANEL: CPT

## 2022-03-03 PROCEDURE — 85027 COMPLETE CBC AUTOMATED: CPT

## 2022-03-03 PROCEDURE — 83036 HEMOGLOBIN GLYCOSYLATED A1C: CPT

## 2022-03-03 PROCEDURE — 99395 PREV VISIT EST AGE 18-39: CPT | Performed by: INTERNAL MEDICINE

## 2022-03-03 PROCEDURE — 81001 URINALYSIS AUTO W/SCOPE: CPT

## 2022-03-03 PROCEDURE — 99214 OFFICE O/P EST MOD 30 MIN: CPT | Performed by: INTERNAL MEDICINE

## 2022-03-03 PROCEDURE — 82306 VITAMIN D 25 HYDROXY: CPT

## 2022-03-03 PROCEDURE — 80053 COMPREHEN METABOLIC PANEL: CPT

## 2022-03-03 NOTE — PROGRESS NOTES
Internal Medicine Annual Exam  Anya Lorenzana is a 31 y.o. male who presents today for an annual exam and with concerns as outlined below.    Chief Complaint  Chief Complaint   Patient presents with   • Annual Exam   • Hand Pain     sharp pain,right hand,   • Headache     after getting off from work, computer work,neck pain        HPI  Mr. Lorenzana comes in today for his physical. He is currently taking no medications. Denies any major changes in health over the last year. He does report a single episode of painless hematuria about 5-6 months ago. Did not recur. Has done genetic testing through  and me showing slight increased risk (24%) of developing kidney stones but notes no pain with episode. 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 He has had two episodes of brief sharp R wrist pain over the last month and has also noted midline thoracic back pain and headaches at the end of the day. He is standing every hour to try to limit time spent in seated position. Not use blue light filter for computer. Notes dental and vision exams are upcoming. He had COVID19 earlier this year so has delayed booster but does plan to get it. He is not using tobacco, alcohol or illicit drugs.       Review of Systems  Review of Systems   Constitutional: Negative.    Eyes: Negative.    Respiratory: Negative.    Cardiovascular: Negative.    Gastrointestinal: Negative.    Genitourinary: Positive for hematuria (5 months ago). Negative for decreased urine volume, difficulty urinating, dysuria, flank pain, frequency and urgency.   Musculoskeletal: Positive for arthralgias and back pain. Negative for gait problem and joint swelling.   Skin: Negative.    Neurological: Positive for headache. Negative for numbness.   Psychiatric/Behavioral: Negative.         Past Medical History  Past Medical History:   Diagnosis Date   • Elevated blood pressure reading 10/30/2019   • Headache    •  "Hyperlipidemia    • Hypertension    • Seasonal allergic rhinitis due to pollen 10/30/2019        Surgical History  History reviewed. No pertinent surgical history.     Family History  Family History   Problem Relation Age of Onset   • Hypertension Mother    • Hypertension Father    • Diabetes Father    • Stroke Father 56        recurrent        Social History  Social History     Socioeconomic History   • Marital status:    Tobacco Use   • Smoking status: Never Smoker   • Smokeless tobacco: Never Used   Vaping Use   • Vaping Use: Never used   Substance and Sexual Activity   • Alcohol use: No   • Drug use: No   • Sexual activity: Defer        Current Medications  Current Outpatient Medications on File Prior to Visit   Medication Sig Dispense Refill   • [DISCONTINUED] vitamin D (ERGOCALCIFEROL) 1.25 MG (33204 UT) capsule capsule Take 1 capsule by mouth 1 (One) Time Per Week. 12 capsule 0     No current facility-administered medications on file prior to visit.       Allergies  No Known Allergies     Objective  Visit Vitals  /70   Pulse 73   Temp 97.1 °F (36.2 °C)   Resp 16   Ht 154.9 cm (60.98\")   Wt 71.3 kg (157 lb 3.2 oz)   SpO2 98%   BMI 29.72 kg/m²        Physical Exam  Physical Exam  Vitals and nursing note reviewed.   Constitutional:       General: He is not in acute distress.     Appearance: He is well-developed. He is not ill-appearing, toxic-appearing or diaphoretic.      Comments: Short stature   HENT:      Head: Normocephalic and atraumatic.      Right Ear: External ear normal.      Left Ear: External ear normal.      Nose: Nose normal.   Eyes:      General: No scleral icterus.     Extraocular Movements: Extraocular movements intact.      Conjunctiva/sclera: Conjunctivae normal.   Cardiovascular:      Rate and Rhythm: Normal rate and regular rhythm.      Heart sounds: Normal heart sounds. No murmur heard.      Pulmonary:      Effort: Pulmonary effort is normal. No respiratory distress.      " Breath sounds: Normal breath sounds.   Abdominal:      General: Bowel sounds are normal. There is no distension.      Palpations: Abdomen is soft. There is no mass.      Tenderness: There is no abdominal tenderness.   Musculoskeletal:         General: Tenderness (mid thoracic spine) present. No swelling.      Cervical back: Neck supple.      Right lower leg: No edema.      Left lower leg: No edema.      Comments: No deformity or swelling of R wrist, finkelsteins negative.   Lymphadenopathy:      Cervical: No cervical adenopathy.   Skin:     General: Skin is warm and dry.      Findings: No rash.   Neurological:      Mental Status: He is alert and oriented to person, place, and time. Mental status is at baseline.      Gait: Gait normal.   Psychiatric:         Mood and Affect: Mood normal.         Behavior: Behavior normal.         Thought Content: Thought content normal.         Judgment: Judgment normal.          Results  Results for orders placed or performed in visit on 02/25/21   CBC (No Diff)    Specimen: Blood   Result Value Ref Range    WBC 7.85 3.40 - 10.80 10*3/mm3    RBC 4.99 4.14 - 5.80 10*6/mm3    Hemoglobin 14.3 13.0 - 17.7 g/dL    Hematocrit 44.0 37.5 - 51.0 %    MCV 88.2 79.0 - 97.0 fL    MCH 28.7 26.6 - 33.0 pg    MCHC 32.5 31.5 - 35.7 g/dL    RDW 13.6 12.3 - 15.4 %    RDW-SD 44.0 37.0 - 54.0 fl    MPV 14.0 (H) 6.0 - 12.0 fL    Platelets 145 140 - 450 10*3/mm3   Comprehensive Metabolic Panel    Specimen: Blood   Result Value Ref Range    Glucose 80 65 - 99 mg/dL    BUN 14 6 - 20 mg/dL    Creatinine 0.81 0.76 - 1.27 mg/dL    Sodium 138 136 - 145 mmol/L    Potassium 4.5 3.5 - 5.2 mmol/L    Chloride 101 98 - 107 mmol/L    CO2 27.9 22.0 - 29.0 mmol/L    Calcium 9.9 8.6 - 10.5 mg/dL    Total Protein 7.7 6.0 - 8.5 g/dL    Albumin 4.50 3.50 - 5.20 g/dL    ALT (SGPT) 21 1 - 41 U/L    AST (SGOT) 23 1 - 40 U/L    Alkaline Phosphatase 71 39 - 117 U/L    Total Bilirubin 0.5 0.0 - 1.2 mg/dL    eGFR Non African Amer  111 >60 mL/min/1.73    eGFR  African Amer 135 >60 mL/min/1.73    Globulin 3.2 gm/dL    A/G Ratio 1.4 g/dL    BUN/Creatinine Ratio 17.3 7.0 - 25.0    Anion Gap 9.1 5.0 - 15.0 mmol/L   Lipid Panel    Specimen: Blood   Result Value Ref Range    Total Cholesterol 221 (H) 0 - 200 mg/dL    Triglycerides 144 0 - 150 mg/dL    HDL Cholesterol 51 40 - 60 mg/dL    LDL Cholesterol  144 (H) 0 - 100 mg/dL    VLDL Cholesterol 26 5 - 40 mg/dL    LDL/HDL Ratio 2.77    Hemoglobin A1c    Specimen: Blood   Result Value Ref Range    Hemoglobin A1C 5.35 4.80 - 5.60 %   Vitamin D 25 Hydroxy    Specimen: Blood   Result Value Ref Range    25 Hydroxy, Vitamin D 20.5 (L) 30.0 - 100.0 ng/ml        Assessment and Plan  Diagnoses and all orders for this visit:    Annual physical exam  - Counseling was given to patient for the following topics:  appropriate exercise, healthy eating habits, disease prevention and importance of immunizations, including risks and benefits. Also discussed the importance of regular dental and vision care, as well recommendation for a yearly screening skin exam after age 40.   - CBC, CMP, A1c ordered  - Discussed COVID booster    Multiple hereditary osteochondromas  - History of multiple hereditary osteochondromas in multiple brothers, son  - He has several palpable bony growths at proximal ends of long bones as well as associated varus deformity and short stature  - Has previously been referred to orthopedics for evaluation of MSK complaints. Was unable to be seen due to not keeping appt for required imaging with xray, MRI.    Vitamin D deficiency  - Chronically low vitamin D, noncompliant with supplementation.  - Will check vitamin D level today    Mixed hyperlipidemia  -  last year, slight improvement. Check lipid panel today.    Gross hematuria  - Single painless episode 5 months ago. Has done genetic testing through [facility] and me with slight increased risk of kidney stones but denies pain.  - UA today    Right  wrist pain  - Likely due to overuse while writing thesis. Recommend icing nightly, tylenol or NSAID for pain.    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 No red flags or neuro deficit.  - Recommend blue light filter on computer screen, updating eye exam, breaks during day away from screen  - Tylenol or NSAID PRN    Acute midline thoracic back pain  - Due to prolonged sitting while writing thesis. Recommend breaks to stand and stretch. Tylenol or NSAID PRN. Heat. Referral to PT.    Health Maintenance   Topic Date Due   • TDAP/TD VACCINES (1 - Tdap) Never done   • COVID-19 Vaccine (3 - Booster for Pfizer series) 11/03/2021   • LIPID PANEL  02/25/2022   • ANNUAL PHYSICAL  02/26/2022   • HEPATITIS C SCREENING  Completed   • INFLUENZA VACCINE  Completed   • Pneumococcal Vaccine 0-64  Aged Out     Health Maintenance  - Colonoscopy: At age 44.  - Immunizations:Flu UTD. Discussed COVID booster. Tdap possibly in 2012, has not been confirmed.  - HCV: negative  - Depression screening: negative 3/2022      Return in about 1 year (around 3/3/2023) for Annual, Labs today.

## 2022-03-04 LAB — HBA1C MFR BLD: 5.2 % (ref 4.8–5.6)

## 2022-03-08 DIAGNOSIS — E78.2 MIXED HYPERLIPIDEMIA: Primary | ICD-10-CM

## 2022-03-08 RX ORDER — ATORVASTATIN CALCIUM 40 MG/1
40 TABLET, FILM COATED ORAL NIGHTLY
Qty: 90 TABLET | Refills: 3 | Status: SHIPPED | OUTPATIENT
Start: 2022-03-08

## 2022-03-21 ENCOUNTER — TREATMENT (OUTPATIENT)
Dept: PHYSICAL THERAPY | Facility: CLINIC | Age: 32
End: 2022-03-21

## 2022-03-21 DIAGNOSIS — M54.6 THORACIC BACK PAIN, UNSPECIFIED BACK PAIN LATERALITY, UNSPECIFIED CHRONICITY: Primary | ICD-10-CM

## 2022-03-21 PROCEDURE — 97162 PT EVAL MOD COMPLEX 30 MIN: CPT | Performed by: PHYSICAL THERAPIST

## 2022-03-21 NOTE — PROGRESS NOTES
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 He has been dealing with the symptoms for about 1-2 months. He feels pain constantly in the middle back when he is sitting for a long time. His pain decreased when he lays down, or on the weekends when he is not sitting for long. He also has headaches and reports about 5 in the past 2 weeks. He states that he starts writing in the morning and then around 3:00-4:00 in the afternoon his pain increases. When he gets up and starts moving around, he can decreased his pain after about 5 minutes.       Patient Occupation: Student  Quality of life: good    Pain  Current pain rating: 3  At best pain ratin  At worst pain ratin  Location: middle back and neck; middle back is more constant. Neck pain is worse in intensity and leads to heacahes   Quality: dull ache  Relieving factors: change in position and rest (laying down)  Aggravating factors: keyboarding (running)  Progression: worsening    Diagnostic Tests  No diagnostic tests performed    Treatments  No previous or current treatments  Patient Goals  Patient goals for therapy: decreased pain, increased motion and increased strength             Objective          Static Posture     Head  Forward.    Shoulders  Rounded.    Thoracic Spine  Hyperkyphosis.    Palpation     Additional Palpation Details  Hypertonicity and TTP noted in silva cervical paraspinals and suboccipitals    Mild hypertonicity and soreness note in silva medial  periscapular muscles.     Active Range of Motion   Cervical/Thoracic Spine   Cervical    Flexion: 41 degrees   Extension: 50 degrees   Left rotation: 45 (stretching sensation in the right trap, slight pain on the left side) degrees   Right rotation: 70 degrees     Lumbar   Flexion: Active lumbar flexion: 80%   Extension: Active lumbar extension: 80%   Left lateral flexion: Active left lumbar lateral flexion: 60%   Right lateral flexion: Active right lumbar lateral flexion: 60%     Strength/Myotome Testing   Cervical Spine     Left   Normal strength    Right   Normal strength    Tests   Cervical     Left   Negative active compression ([place]) and Spurling's sign.     Right   Negative active compression ([place]) and Spurling's sign.     Ambulation     Ambulation: Level Surfaces     Additional Level Surfaces Ambulation Details  No gait deviation noted in the clinic today.           Assessment & Plan     Assessment  Impairments: abnormal muscle tone, abnormal or restricted ROM, activity intolerance, impaired physical strength, lacks appropriate home exercise program and pain with function  Functional Limitations: carrying objects, lifting, pulling, pushing, uncomfortable because of pain and unable to perform repetitive tasks  Assessment details: Patient is a 32 year old male who comes to physical therapy with c/o intermittent pain in the middle back and neck, cuco when working at his computer for a long time. Signs and symptoms are consistent with cervical and thoracic postural deviation with associated muscle guarding and soreness  resulting in pain, decreased ROM, decreased strength, and inability to perform all essential functional activities. Pt will benefit from skilled PT services to address the above issues.     Prognosis: good  Prognosis details: SHORT TERM GOALS:     2 weeks  1. Pt independent with HEP  2. Pt to demonstrate cervical AROM 50-75% of expected norms to allow for improved ability to perform  ADL's  3. Pt to report not having any headaches related to neck pain for the past 3 days    LONG TERM GOALS:   6 weeks  1. Pt to demonstrate cervical AROM % of expected norms to allow for improved safety when driving  2. Pt to demonstrate ability to lift 10# [place] with bilateral arm(s) without increase in pain in the neck   3. Pt to report being able to work full shift or work in the home without increase in pain in the neck      Plan  Therapy options: will be seen for skilled therapy services  Planned modality interventions: cryotherapy, high voltage pulsed current (pain management), iontophoresis, microcurrent electrical stimulation, TENS, thermotherapy (hydrocollator packs), ultrasound and traction  Planned therapy interventions: ADL retraining, body mechanics training, flexibility, functional ROM exercises, home exercise program, IADL retraining, joint mobilization, manual therapy, motor coordination training, neuromuscular re-education, postural training, soft tissue mobilization, spinal/joint mobilization, strengthening and stretching  Frequency: 2x week  Duration in weeks: 12  Treatment plan discussed with: patient        Evaluation Only      PT SIGNATURE: Margarito Bailey PT, DPT, OCS, Cert. DN   DATE TREATMENT INITIATED: 3/21/2022    Initial Certification  Certification Period: 3/21/2022 thru 6/19/2022  I certify that the therapy services are furnished while this patient is under my care.  The services outlined above are required by this patient, and will be reviewed every 90 days.     PHYSICIAN: Briana Franks MD      DATE:     Please sign and return via fax to [phone].. Thank you, [facility].

## 2022-03-31 ENCOUNTER — TREATMENT (OUTPATIENT)
Dept: PHYSICAL THERAPY | Facility: CLINIC | Age: 32
End: 2022-03-31

## 2022-03-31 DIAGNOSIS — M54.6 THORACIC BACK PAIN, UNSPECIFIED BACK PAIN LATERALITY, UNSPECIFIED CHRONICITY: Primary | ICD-10-CM

## 2022-03-31 PROCEDURE — 97112 NEUROMUSCULAR REEDUCATION: CPT | Performed by: PHYSICAL THERAPIST

## 2022-03-31 PROCEDURE — 97110 THERAPEUTIC EXERCISES: CPT | Performed by: PHYSICAL THERAPIST

## 2022-03-31 NOTE — PROGRESS NOTES
Physical Therapy Daily Progress Note    Subjective   Anya Lorenzana reports that he has been feeling a little better since his initial visit. He has noticed that his pain is significantly decreased when he limits prolonged sitting.       Objective   See Exercise, Manual, and Modality Logs for complete treatment.       Assessment/Plan     Pt responded well to treatment today and he was able to complete all activity without exacerbation of symptoms. Will cont to progress as indicated.     Progress per Plan of Care and Progress strengthening /stabilization /functional activity           Manual Therapy:         mins  26425;  Therapeutic Exercise:    24     mins  38138;     Neuromuscular Rao:    30    mins  03129;    Therapeutic Activity:          mins  53989;     Gait Training:           mins  59275;     Ultrasound:          mins  34933;    Electrical Stimulation:         mins  75605 ( );  E-Stim Attended:         mins  64480  Iontophoresis          mins [id number]   Traction          mins  40587  Fluidotherapy          mins  64469  Dry Needling          mins self-pay - No Charge  Paraffin          mins  22101    Timed Treatment:   54   mins   Total Treatment:     54   mins    Margarito Bailey, PT, DPT, OCS, Cert. DN  Physical Therapist